# Patient Record
Sex: FEMALE | Race: WHITE | URBAN - METROPOLITAN AREA
[De-identification: names, ages, dates, MRNs, and addresses within clinical notes are randomized per-mention and may not be internally consistent; named-entity substitution may affect disease eponyms.]

---

## 2017-11-08 ENCOUNTER — OFFICE VISIT (OUTPATIENT)
Dept: URGENT CARE | Facility: URGENT CARE | Age: 22
End: 2017-11-08
Payer: COMMERCIAL

## 2017-11-08 VITALS
TEMPERATURE: 99.1 F | HEART RATE: 115 BPM | DIASTOLIC BLOOD PRESSURE: 68 MMHG | OXYGEN SATURATION: 100 % | SYSTOLIC BLOOD PRESSURE: 120 MMHG

## 2017-11-08 DIAGNOSIS — Z11.3 SCREEN FOR STD (SEXUALLY TRANSMITTED DISEASE): ICD-10-CM

## 2017-11-08 DIAGNOSIS — R07.0 THROAT PAIN: Primary | ICD-10-CM

## 2017-11-08 DIAGNOSIS — J35.01 CHRONIC TONSILLITIS: ICD-10-CM

## 2017-11-08 DIAGNOSIS — J02.0 STREP THROAT: ICD-10-CM

## 2017-11-08 LAB
DEPRECATED S PYO AG THROAT QL EIA: ABNORMAL
SPECIMEN SOURCE: ABNORMAL

## 2017-11-08 PROCEDURE — 87491 CHLMYD TRACH DNA AMP PROBE: CPT | Performed by: FAMILY MEDICINE

## 2017-11-08 PROCEDURE — 87880 STREP A ASSAY W/OPTIC: CPT | Performed by: FAMILY MEDICINE

## 2017-11-08 PROCEDURE — 99204 OFFICE O/P NEW MOD 45 MIN: CPT | Performed by: FAMILY MEDICINE

## 2017-11-08 PROCEDURE — 87591 N.GONORRHOEAE DNA AMP PROB: CPT | Performed by: FAMILY MEDICINE

## 2017-11-08 NOTE — LETTER
[facility] URGENT CARE  [address]  [facility] [address]  [phone]      November 8, 2017    RE:  Tika Justin                                                                                                                                                       [address] [facility] [address]            To whom it may concern:    Tika Justin is under my professional care for    Throat pain  Chronic tonsillitis.   She  may return to work with the following: No restrictions  On 11/10/2017          Sincerely,        Lissett Arcos    [facility]

## 2017-11-08 NOTE — MR AVS SNAPSHOT
After Visit Summary   11/8/2017    Tika Justin    MRN: [id number]           Patient Information     Date Of Birth          1995        Visit Information        Provider Department      11/8/2017 7:05 PM Lissett Arcos MD [facility] URGENT CARE        Today's Diagnoses     Throat pain    -  1    Chronic tonsillitis          Care Instructions      Understanding Tonsillectomy and Adenoidectomy    Tonsils and adenoids are clusters of tissues in the back of the throat. These tissues form part of the body s immune system, which helps the body fight disease. If these structures repeatedly become infected or become enlarged, they can lead to problems. They may then be removed with surgery. Surgery to remove the tonsils is called tonsillectomy. In some cases, the adenoids are also removed. This is called adenoidectomy.  Why tonsillectomy and adenoidectomy are done  You may have your tonsils, adenoids, or both removed for reasons that include:    Infection of the tonsils (tonsillitis) that keeps coming back    Repeated infections of the throat    Enlargement of the tonsils or adenoids that affects breathing during sleep. This causes a condition called obstructive sleep apnea.    Suspected cancer of the throat  Tonsillectomy can remove part or all of the tonsils.  How tonsillectomy and adenoidectomy are done  This surgery is done in a hospital or surgery center. It usually takes less than 1 hour.    An IV line is inserted in a vein in your arm or hand. This gives you fluids and medicines.    You are given general anesthesia to put you into a deep sleep through the procedure.    A special device is used to hold your mouth open. A tube is put down into your throat to help keep your airway open during the procedure.    The doctor uses surgical tools to remove the tonsils and possibly the adenoids.    The doctor removes all of the tools.    You are sent home when you are awake and recovered from  the anesthesia.  Risks of tonsillectomy and adenoidectomy  Risks include:    Bleeding    Electric burns of the mouth and lip    Infection    Injury to the lips or teeth    Numbness of the tongue    Risks of anesthesia    The need for a second surgery    Voice changes  Date Last Reviewed: 6/1/2016 2000-2017 [employer]. [address] [place] [id number]. All rights reserved. This information is not intended as a substitute for professional medical care. Always follow your healthcare professional's instructions.        Pharyngitis: Strep (Confirmed)    You have had a positive test for strep throat. Strep throat is a contagious illness. It is spread by coughing, kissing or by touching others after touching your mouth or nose. Symptoms include throat pain that is worse with swallowing, aching all over, headache, and fever. It is treated with antibiotic medicine. This should help you start to feel better in 1 to 2 days.  Home care    Rest at home. Drink plenty of fluids to you won't get dehydrated.    No work or school for the first 2 days of taking the antibiotics. After this time, you will not be contagious. You can then return to school or work if you are feeling better.     Take antibiotic medicine for the full 10 days, even if you feel better. This is very important to ensure the infection is treated. It is also important to prevent medicine-resistant germs from developing. If you were given an antibiotic shot, you don't need any more antibiotics.    You may use acetaminophen or ibuprofen to control pain or fever, unless another medicine was prescribed for this. Talk with your doctor before taking these medicines if you have chronic liver or kidney disease. Also talk with your doctor if you have had a stomach ulcer or GI bleeding.    Throat lozenges or sprays help reduce pain. Gargling with warm saltwater will also reduce throat pain. Dissolve 1/2 teaspoon of salt in 1 glass of warm water.  This may be useful just before meals.     Soft foods are OK. Avoid salty or spicy foods.  Follow-up care  Follow up with your healthcare provider or our staff if you don't get better over the next week.  When to seek medical advice  Call your healthcare provider right away if any of these occur:    Fever of 100.4 F (38 C) or higher, or as directed by your healthcare provider    New or worsening ear pain, sinus pain, or headache    Painful lumps in the back of neck    Stiff neck    Lymph nodes getting larger or becoming soft in the middle    You can't swallow liquids or you can't open your mouth wide because of throat pain    Signs of dehydration. These include very dark urine or no urine, sunken eyes, and dizziness.    Trouble breathing or noisy breathing    Muffled voice    Rash  Date Last Reviewed: 4/13/2015 2000-2017 [employer]. [address] [place] [address]. All rights reserved. This information is not intended as a substitute for professional medical care. Always follow your healthcare professional's instructions.                Follow-ups after your visit        Who to contact     If you have questions or need follow up information about today's clinic visit or your schedule please contact [facility] URGENT CARE directly at [phone].  Normal or non-critical lab and imaging results will be communicated to you by [employer], letter or phone within 4 business days after the clinic has received the results. If you do not hear from us within 7 days, please contact the clinic through [employer] or phone. If you have a critical or abnormal lab result, we will notify you by phone as soon as possible.  Submit refill requests through [employer] or call your pharmacy and they will forward the refill request to us. Please allow 3 business days for your refill to be completed.          Additional Information About Your Visit        [employer] Information     [employer] lets you send messages to  "your doctor, view your test results, renew your prescriptions, schedule appointments and more. To sign up, go to [email or web address] . Click on \"Log in\" on the left side of the screen, which will take you to the Welcome page. Then click on \"Sign up Now\" on the right side of the page.     You will be asked to enter the access code listed below, as well as some personal information. Please follow the directions to create your username and password.     Your access code is: [email or web address]  Expires: 2018  7:39 PM     Your access code will  in 90 days. If you need help or a new code, please call your [facility] clinic or [phone].        Care EveryWhere ID     This is your Care EveryWhere ID. This could be used by other organizations to access your [facility] medical records  [id number]        Your Vitals Were     Pulse Temperature Pulse Oximetry             115 99.1  F (37.3  C) (Oral) 100%          Blood Pressure from Last 3 Encounters:   17 120/68    Weight from Last 3 Encounters:   No data found for Wt              We Performed the Following     Rapid strep screen          Today's Medication Changes          These changes are accurate as of: 17  7:39 PM.  If you have any questions, ask your nurse or doctor.               Start taking these medicines.        Dose/Directions    amoxicillin-clavulanate 875-125 MG per tablet   Commonly known as:  AUGMENTIN   Used for:  Chronic tonsillitis, Throat pain        Dose:  1 tablet   Take 1 tablet by mouth 2 times daily for 21 days   Quantity:  42 tablet   Refills:  0            Where to get your medicines      These medications were sent to [address] [address] [place] [id number] [facility] [address]    Hours:  Tech issues with their phone system Phone:  [phone]     amoxicillin-clavulanate 875-125 MG per tablet                Primary Care Provider    Physician No Ref-Primary       NO REF-PRIMARY PHYSICIAN   "      Equal Access to Services     [facility] : Hadii aad ku hadstephaniwilfredo Mariifransisco, wacandaceda luqadaha, qaanata elizabethbodeb brizuela. So [facility] [place] [phone].    ATENCIÓN: Si habla español, tiene a hartman disposición servicios gratuitos de asistencia lingüística. Llame al [phone].    We comply with applicable federal civil rights laws and [place] laws. We do not discriminate on the basis of race, color, national origin, age, disability, sex, sexual orientation, or gender identity.            Thank you!     Thank you for choosing [facility] URGENT CARE  for your care. Our goal is always to provide you with excellent care. Hearing back from our patients is one way we can continue to improve our services. Please take a few minutes to complete the written survey that you may receive in the mail after your visit with us. Thank you!             Your Updated Medication List - Protect others around you: Learn how to safely use, store and throw away your medicines at www.disposemymeds.org.          This list is accurate as of: 11/8/17  7:39 PM.  Always use your most recent med list.                   Brand Name Dispense Instructions for use Diagnosis    amoxicillin-clavulanate 875-125 MG per tablet    AUGMENTIN    42 tablet    Take 1 tablet by mouth 2 times daily for 21 days    Chronic tonsillitis, Throat pain

## 2017-11-09 NOTE — NURSING NOTE
Chief Complaint   Patient presents with     Urgent Care     Pharyngitis     Possible strep- Sore throat x2 weeks, chills, bilateral ear pain, myalgia, no appetite       Initial /68 (BP Location: Right arm, Patient Position: Chair, Cuff Size: Adult Large)  Pulse 115  Temp 99.1  F (37.3  C) (Oral)  SpO2 100% There is no height or weight on file to calculate BMI.  Medication Reconciliation: complete     Junie Oscar CMA (AAMA)

## 2017-11-09 NOTE — PROGRESS NOTES
SUBJECTIVE:  Chief Complaint   Patient presents with     Urgent Care     Pharyngitis     Possible strep- Sore throat x2 weeks, chills, bilateral ear pain, myalgia, no appetite     Tika Justin is a 22 year old female with a chief complaint of sore throat.  Onset of symptoms was 1 month(s) ago, or more, She has repeated, recurrent tonsillitis since childhood.    Course of illness: gradual onset, still present, constant and worsening.  Severity moderate  Current and Associated symptoms: chills, ear pain bilateral, sore throat, body aches and nausea  Treatment measures tried include  She was given an RX of penicillin bid from [facility] 2 weeks ago,  She felt better when taking the penicillin, but symptoms became much worse in the past 4 days since finishing the penicillin  Predisposing factors include recurrent tonsillitis    She has had to miss work repeatedly due to tonsillitis-  Wants to investigate ways to eliminate this recurrent tonsillitis    She has had oral sex and is concerned that she could have GC/ Chlamydia of the throat/ tonsils, so she would like testing..    PMH:  Recurrent otitis media, tonsillitis    ALLERGIES:  None known    No current outpatient prescriptions on file prior to visit.  No current facility-administered medications on file prior to visit.     Social Hx:  Recently moved to [place] from [place] - is living with her aunt    Family History:  Non-contributory,  No associated family health conditions    Review Of Systems    Constitutional:  fevers, chills, fatigue  Skin: negative for rash or lesions  Eyes: negative for eye pain, visual changes  Ears/Nose/Throat: has recurrent earache  , no sinus trouble,  Has  persistent sore throat  Respiratory: No shortness of breath, dyspnea on exertion  or hemoptysis-  Not aware of snoring/ sleep apnea  Cardiovascular: negative for, palpitations, tachycardia and chest pain  Gastrointestinal: negative for vomiting, abdominal pain and diarrhea  Genitourinary:  negative for dysuria and hematuria  Back: negative for pain with back movement,  CVA pain  Musculoskeletal: negative for  , joint swelling and joint stiffness.  Has had muscle/ joint aching with the current illness  Neurologic: negative for generalized or local weakness or incoordination       OBJECTIVE:   /68 (BP Location: Right arm, Patient Position: Chair, Cuff Size: Adult Large)  Pulse 115  Temp 99.1  F (37.3  C) (Oral)  SpO2 100%  GENERAL APPEARANCE: healthy, moderate distress and cooperative  EYES: EOMI,  PERRL, conjunctiva clear  HENT: ear canals and TM's normal.  Nose normal.  Pharynx erythematous with some exudate noted.  HENT: tonsillar hypertrophy, tonsillar erythema and tonsillar exudate-  bilateral  NECK: supple, non-tender to palpation, no adenopathy noted  RESP: lungs clear to auscultation - no rales, rhonchi or wheezes  CV: regular rates and rhythm, normal S1 S2, no murmur noted  SKIN: no suspicious lesions or rashes    Rapid Strep test is positive    ASSESSMENT:  Throat pain     - Rapid strep screen       Chronic tonsillitis    Due to her history of recurrent tonsillitis will do a trial of a long course of Augmentin- 21 days to evaluate if her symptoms clear for a longer period     - amoxicillin-clavulanate (AUGMENTIN) 875-125 MG per tablet; Take 1 tablet by mouth 2 times daily for 21 days    If she would have rapid recurrance of  Tonsillitis after 3 weeks of augmentin she should have culture to see if her strep may be penicillin resistant (relatively rare)    We discussed that oral sex could produce a STD infection in the tonsils-  She requested to have her throat swabbed for Gc/ chlamydia    We discusses tonsillectomy is the treatment some people do for chronic tonsillitis, while others choose to have repeated treatment with antibiotics.  If she wants her insurance to cover surgery she would need to check with her insurer about their criteria for  Approving coverage for tonsillectomy.   Sleep apnea could be a consideration for tonsillectomy.  She should first check with her insurer, then with an ENT surgeon    Screen for STD (sexually transmitted disease)   tested for infection in the throat  - NEISSERIA GONORRHOEA PCR  - CHLAMYDIA TRACHOMATIS PCR     Strep throat      Augmentin 875 mg  Bid x 3 wks  Patient was counseled that to prevent spreading the strep infection that she should stay out of public places, work or school until she has completed 24 hours of antibiotic treatment     Symptomatic treat with gargles, lozenges, and OTC analgesic as needed. Follow-up with primary clinic if not improving.    Note for work

## 2017-11-09 NOTE — PATIENT INSTRUCTIONS
Understanding Tonsillectomy and Adenoidectomy    Tonsils and adenoids are clusters of tissues in the back of the throat. These tissues form part of the body s immune system, which helps the body fight disease. If these structures repeatedly become infected or become enlarged, they can lead to problems. They may then be removed with surgery. Surgery to remove the tonsils is called tonsillectomy. In some cases, the adenoids are also removed. This is called adenoidectomy.  Why tonsillectomy and adenoidectomy are done  You may have your tonsils, adenoids, or both removed for reasons that include:    Infection of the tonsils (tonsillitis) that keeps coming back    Repeated infections of the throat    Enlargement of the tonsils or adenoids that affects breathing during sleep. This causes a condition called obstructive sleep apnea.    Suspected cancer of the throat  Tonsillectomy can remove part or all of the tonsils.  How tonsillectomy and adenoidectomy are done  This surgery is done in a hospital or surgery center. It usually takes less than 1 hour.    An IV line is inserted in a vein in your arm or hand. This gives you fluids and medicines.    You are given general anesthesia to put you into a deep sleep through the procedure.    A special device is used to hold your mouth open. A tube is put down into your throat to help keep your airway open during the procedure.    The doctor uses surgical tools to remove the tonsils and possibly the adenoids.    The doctor removes all of the tools.    You are sent home when you are awake and recovered from the anesthesia.  Risks of tonsillectomy and adenoidectomy  Risks include:    Bleeding    Electric burns of the mouth and lip    Infection    Injury to the lips or teeth    Numbness of the tongue    Risks of anesthesia    The need for a second surgery    Voice changes  Date Last Reviewed: 6/1/2016 2000-2017 [employer]. [address] [place] [address], [place] [address].  All rights reserved. This information is not intended as a substitute for professional medical care. Always follow your healthcare professional's instructions.        Pharyngitis: Strep (Confirmed)    You have had a positive test for strep throat. Strep throat is a contagious illness. It is spread by coughing, kissing or by touching others after touching your mouth or nose. Symptoms include throat pain that is worse with swallowing, aching all over, headache, and fever. It is treated with antibiotic medicine. This should help you start to feel better in 1 to 2 days.  Home care    Rest at home. Drink plenty of fluids to you won't get dehydrated.    No work or school for the first 2 days of taking the antibiotics. After this time, you will not be contagious. You can then return to school or work if you are feeling better.     Take antibiotic medicine for the full 10 days, even if you feel better. This is very important to ensure the infection is treated. It is also important to prevent medicine-resistant germs from developing. If you were given an antibiotic shot, you don't need any more antibiotics.    You may use acetaminophen or ibuprofen to control pain or fever, unless another medicine was prescribed for this. Talk with your doctor before taking these medicines if you have chronic liver or kidney disease. Also talk with your doctor if you have had a stomach ulcer or GI bleeding.    Throat lozenges or sprays help reduce pain. Gargling with warm saltwater will also reduce throat pain. Dissolve 1/2 teaspoon of salt in 1 glass of warm water. This may be useful just before meals.     Soft foods are OK. Avoid salty or spicy foods.  Follow-up care  Follow up with your healthcare provider or our staff if you don't get better over the next week.  When to seek medical advice  Call your healthcare provider right away if any of these occur:    Fever of 100.4 F (38 C) or higher, or as directed by your healthcare  provider    New or worsening ear pain, sinus pain, or headache    Painful lumps in the back of neck    Stiff neck    Lymph nodes getting larger or becoming soft in the middle    You can't swallow liquids or you can't open your mouth wide because of throat pain    Signs of dehydration. These include very dark urine or no urine, sunken eyes, and dizziness.    Trouble breathing or noisy breathing    Muffled voice    Rash  Date Last Reviewed: 4/13/2015 2000-2017 [employer]. [address] [place] [id number]. All rights reserved. This information is not intended as a substitute for professional medical care. Always follow your healthcare professional's instructions.

## 2017-11-10 LAB
C TRACH DNA SPEC QL NAA+PROBE: NEGATIVE
N GONORRHOEA DNA SPEC QL NAA+PROBE: NEGATIVE
SPECIMEN SOURCE: NORMAL
SPECIMEN SOURCE: NORMAL

## 2017-11-15 ENCOUNTER — OFFICE VISIT (OUTPATIENT)
Dept: FAMILY MEDICINE | Facility: CLINIC | Age: 22
End: 2017-11-15
Payer: COMMERCIAL

## 2017-11-15 VITALS
HEART RATE: 75 BPM | RESPIRATION RATE: 16 BRPM | OXYGEN SATURATION: 98 % | SYSTOLIC BLOOD PRESSURE: 110 MMHG | WEIGHT: 190 LBS | TEMPERATURE: 98 F | DIASTOLIC BLOOD PRESSURE: 66 MMHG | BODY MASS INDEX: 33.66 KG/M2 | HEIGHT: 63 IN

## 2017-11-15 DIAGNOSIS — N89.8 VAGINAL DISCHARGE: Primary | ICD-10-CM

## 2017-11-15 LAB
SPECIMEN SOURCE: NORMAL
WET PREP SPEC: NORMAL

## 2017-11-15 PROCEDURE — 99213 OFFICE O/P EST LOW 20 MIN: CPT | Performed by: NURSE PRACTITIONER

## 2017-11-15 PROCEDURE — 87210 SMEAR WET MOUNT SALINE/INK: CPT | Performed by: NURSE PRACTITIONER

## 2017-11-15 RX ORDER — FLUCONAZOLE 150 MG/1
150 TABLET ORAL
Qty: 4 TABLET | Refills: 0 | Status: SHIPPED | OUTPATIENT
Start: 2017-11-15

## 2017-11-15 NOTE — MR AVS SNAPSHOT
"              After Visit Summary   11/15/2017    Tika Justin    MRN: [id number]           Patient Information     Date Of Birth          1995        Visit Information        Provider Department      11/15/2017 4:30 PM Ines Blount NP [facility]        Today's Diagnoses     Vaginal discharge    -  1       Follow-ups after your visit        Who to contact     If you have questions or need follow up information about today's clinic visit or your schedule please contact [facility] directly at [phone].  Normal or non-critical lab and imaging results will be communicated to you by MyChart, letter or phone within 4 business days after the clinic has received the results. If you do not hear from us within 7 days, please contact the clinic through [employer] or phone. If you have a critical or abnormal lab result, we will notify you by phone as soon as possible.  Submit refill requests through [employer] or call your pharmacy and they will forward the refill request to us. Please allow 3 business days for your refill to be completed.          Additional Information About Your Visit        MyChart Information     [employer] lets you send messages to your doctor, view your test results, renew your prescriptions, schedule appointments and more. To sign up, go to [email or web address] . Click on \"Log in\" on the left side of the screen, which will take you to the Welcome page. Then click on \"Sign up Now\" on the right side of the page.     You will be asked to enter the access code listed below, as well as some personal information. Please follow the directions to create your username and password.     Your access code is: [email or web address]  Expires: 2018  7:39 PM     Your access code will  in 90 days. If you need help or a new code, please call your [facility] or [phone].        Care EveryWhere ID     This is your Care EveryWhere ID. This could be used by other organizations " "to access your [place] medical records  [id number]        Your Vitals Were     Pulse Temperature Respirations Height Last Period Pulse Oximetry    75 98  F (36.7  C) (Oral) 16 5' 2.5\" (1.588 m) 10/15/2017 98%    Breastfeeding? BMI (Body Mass Index)                No 34.2 kg/m2           Blood Pressure from Last 3 Encounters:   11/15/17 110/66   11/08/17 120/68    Weight from Last 3 Encounters:   11/15/17 190 lb (86.2 kg)              We Performed the Following     Wet prep          Today's Medication Changes          These changes are accurate as of: 11/15/17  5:09 PM.  If you have any questions, ask your nurse or doctor.               Start taking these medicines.        Dose/Directions    fluconazole 150 MG tablet   Commonly known as:  DIFLUCAN   Used for:  Vaginal discharge   Started by:  Ines Blount NP        Dose:  150 mg   Take 1 tablet (150 mg) by mouth every 3 days   Quantity:  4 tablet   Refills:  0            Where to get your medicines      These medications were sent to [place] [id number]    Hours:  Tech issues with their phone system Phone:  [phone]     fluconazole 150 MG tablet                Primary Care Provider Office Phone # Fax #    Ines Blount [phone]       [facility] [id number] [place] [id number]        Equal Access to Services     EDUARDO BENJAMIN AH: Hadii flavio ku hadasho Soomaali, waaxda luqadaha, qaybta kaalmada adeegyada, deb walters. So [facility] [phone].    ATENCIÓN: Si habla español, tiene a hartman disposición servicios gratuitos de asistencia lingüística. Karelame al [phone].    We comply with applicable federal civil rights laws and [place] laws. We do not discriminate on the basis of race, color, national origin, age, disability, sex, sexual orientation, or gender identity.            Thank you!     Thank you for choosing " [facility]  for your care. Our goal is always to provide you with excellent care. Hearing back from our patients is one way we can continue to improve our services. Please take a few minutes to complete the written survey that you may receive in the mail after your visit with us. Thank you!             Your Updated Medication List - Protect others around you: Learn how to safely use, store and throw away your medicines at www.disposemymeds.org.          This list is accurate as of: 11/15/17  5:09 PM.  Always use your most recent med list.                   Brand Name Dispense Instructions for use Diagnosis    amoxicillin-clavulanate 875-125 MG per tablet    AUGMENTIN    42 tablet    Take 1 tablet by mouth 2 times daily for 21 days    Chronic tonsillitis, Throat pain       fluconazole 150 MG tablet    DIFLUCAN    4 tablet    Take 1 tablet (150 mg) by mouth every 3 days    Vaginal discharge

## 2017-11-15 NOTE — PROGRESS NOTES
SUBJECTIVE:   Tika Justin is a 22 year old female who presents to clinic today for the following health issues:      Vaginal Symptoms, but better now      Duration: 3 days    Description  itching    Intensity:  moderate    Accompanying signs and symptoms (fever/dysuria/abdominal or back pain): None    History  Sexually active: not at present  Possibility of pregnancy: No  Recent antibiotic use: YES    Precipitating or alleviating factors: discharge    Therapies tried and outcome: tried something, forgot the name   Outcome: is better  Patient has been seen on several different occasions for recurrent sore throat. Diagnosed with strep throat at a [facility] clinic and was seen in our urgent care and placed on antibiotics for 3 weeks. Had an episode of vaginal discharge and started treating herself with Monistat over-the-counter and is here today to follow-up. Symptoms seem to be improving but she is still concerned about yeast infection. Has about 10 days left of antibiotics. Is interested in having her tonsils removed. History of recurrent strep as a child but none recently beyond current episode. Recently relocated here from [place] and has had sinus congestion since moving.          Problem list and histories reviewed & adjusted, as indicated.  Additional history: none    There is no problem list on file for this patient.    History reviewed. No pertinent surgical history.    Social History   Substance Use Topics     Smoking status: Never Smoker     Smokeless tobacco: Never Used     Alcohol use No     History reviewed. No pertinent family history.          Reviewed and updated as needed this visit by clinical staff     Reviewed and updated as needed this visit by Provider         ROS:  Constitutional, HEENT, cardiovascular, pulmonary, gi and gu systems are negative, except as otherwise noted.      OBJECTIVE:   /66 (BP Location: Right arm, Patient Position: Chair, Cuff Size: Adult Regular)  Pulse 75  Temp 98  F  "(36.7  C) (Oral)  Resp 16  Ht 5' 2.5\" (1.588 m)  Wt 190 lb (86.2 kg)  LMP 10/15/2017  SpO2 98%  Breastfeeding? No  BMI 34.2 kg/m2  Body mass index is 34.2 kg/(m^2).  GENERAL: healthy, alert and no distress  EYES: Eyes grossly normal to inspection, PERRL and conjunctivae and sclerae normal  HENT: ear canals and TM's normal, nose and mouth without ulcers or lesions  NECK: no adenopathy, no asymmetry, masses, or scars and thyroid normal to palpation  RESP: lungs clear to auscultation - no rales, rhonchi or wheezes  CV: regular rate and rhythm, normal S1 S2, no S3 or S4, no murmur, click or rub, no peripheral edema and peripheral pulses strong  PSYCH: mentation appears normal and affect flat    Results for orders placed or performed in visit on 11/15/17 (from the past 24 hour(s))   Wet prep   Result Value Ref Range    Specimen Description Vagina     Wet Prep No Trichomonas seen     Wet Prep No clue cells seen     Wet Prep No yeast seen        ASSESSMENT/PLAN:             1. Vaginal discharge   Wet prep is normal. I sent several tablets of Diflucan to be used since she still has 2 weeks of Augmentin left for recurrent tonsillitis. Advised to only use if symptoms persist and take 1 tablet every 3 days as needed. Encourage use of probiotics with any antibiotic.  - Wet prep  - fluconazole (DIFLUCAN) 150 MG tablet; Take 1 tablet (150 mg) by mouth every 3 days  Dispense: 4 tablet; Refill: 0    Follow-up as needed.    Ines Blount, NP  [facility]  "

## 2017-11-15 NOTE — NURSING NOTE
"Chief Complaint   Patient presents with     RECHECK       Initial /66 (BP Location: Right arm, Patient Position: Chair, Cuff Size: Adult Regular)  Pulse 75  Temp 98  F (36.7  C) (Oral)  Resp 16  Ht 5' 2.5\" (1.588 m)  Wt 190 lb (86.2 kg)  LMP 10/15/2017  SpO2 98%  Breastfeeding? No  BMI 34.2 kg/m2 Estimated body mass index is 34.2 kg/(m^2) as calculated from the following:    Height as of this encounter: 5' 2.5\" (1.588 m).    Weight as of this encounter: 190 lb (86.2 kg).  Medication Reconciliation: complete     Radames House CMA      "

## 2018-01-28 ENCOUNTER — HEALTH MAINTENANCE LETTER (OUTPATIENT)
Age: 23
End: 2018-01-28

## 2018-04-08 ENCOUNTER — HEALTH MAINTENANCE LETTER (OUTPATIENT)
Age: 23
End: 2018-04-08